# Patient Record
Sex: FEMALE | Race: WHITE
[De-identification: names, ages, dates, MRNs, and addresses within clinical notes are randomized per-mention and may not be internally consistent; named-entity substitution may affect disease eponyms.]

---

## 2018-03-05 NOTE — PDOC
Rapid Medical Evaluation


Time Seen by Provider: 03/05/18 17:48


Medical Evaluation: 


 Allergies











Allergy/AdvReac Type Severity Reaction Status Date / Time


 


No Known Allergies Allergy   Verified 03/14/16 16:54











03/05/18 17:48


I have performed a brief in-person evaluation of this patient.





The patient presents with a chief complaint of numbness in left 4th toe since 

Sunday.  Reports pedicure done 6 days ago.  


Denies injury to toe or wearing tight shoes. 





Pertinent physical exam findings


NAD


unlabored breathing 


ext: left toes, non erythematous, cap refill wnl





I have ordered the following:


urine hcg


The patient will proceed to the ED for further evaluation.

## 2018-03-05 NOTE — PDOC
History of Present Illness





- General


Chief Complaint: Ingrown toenail


Stated Complaint: PAIN


Time Seen by Provider: 03/05/18 17:48





- History of Present Illness


Initial Comments: 





03/05/18 18:38


CHIEF COMPLAINT: toe problem





HISTORY OF PRESENT ILLNESS: 24 yo F with no PMH presents to fast [facility] with 

concerns of numbness to her L 4th toe.  Patient reports that she feels the toe 

is "numb and swollen and I was concerned because there's a family history of 

diabetes and I think my uncle had his toe amputated before."  She denies any 

injury, trauma, or pain to the toe, denies any fever, chills, vomiting, diarrhea

, or any other infectious symptoms.  She denies that she has ever had a problem 

with her blood sugar herself. 





No recent travel or sick contacts. 





PAST MEDICAL HISTORY: Denies past medical history





FAMILY HISTORY: Denies





SOCIAL HISTORY: Denies tobacco, alcohol, illicit drug use. 





SURGICAL HISTORY: Denies





ALLERGIES: No known drug allergies





REVIEW OF SYSTEMS


see HPI





PHYSICAL EXAM


General Appearance: Well-appearing, appropriately dressed.  No apparent distress

, no intoxication.





HEENT: EOMI, PERRLA, normal ENT inspection, normal voice, TMs normal, pharynx 

normal.  No conjunctival pallor.  No photophobia, scleral icterus.





Neck: Supple.  Trachea midline. No tenderness, rigidity, carotid bruit, stridor

, lymphadenopathy, or thyromegaly. 





Respiratory/Chest: Lungs CTAB.  No shortness of breath, chest tenderness, 

respiratory distress, accessory muscle use. No crackles, rales, rhonchi, stridor

, wheezing, dullness





Cardiovascular: RRR. S1, S2.  No JVD, murmur, bradycardia, tachycardia.





Vascular Pulses: Dorsalis-Pedis (R): 2+, Dorsalis-Pedis (L): 2+





Gastrointestinal/Abdominal: Normal bowel sounds.  Abdomen soft, non-distended.  

No tenderness or rebound tenderness. No  organomegaly, pulsatile mass, guarding

, hernia, hepatomegaly, splenomegaly.





Lymphatic: No adenopathy, tenderness.





Musculoskeletal/Extremities:  Normal inspection. FROM of all extremities, 

normal capillary refill.  Pelvis Stable.  No CVA tenderness. No tenderness to 

extremities, pedal edema, swelling, erythema or deformity.





Integumentary: Appropriate color, dry, warm.  No cyanosis, erythema, jaundice 

or rash





Neurologic: CNs II-XII intact. Fully oriented, alert.  Appropriate mood/affect. 

Motor strength 5/5.  No appreciable EOM palsy, facial droop or sensory deficit.

: He





Past History





- Past Medical History


Allergies/Adverse Reactions: 


 Allergies











Allergy/AdvReac Type Severity Reaction Status Date / Time


 


No Known Allergies Allergy   Verified 03/05/18 17:48











Home Medications: 


Ambulatory Orders





NK [No Known Home Medication]  03/05/18 








Asthma: Yes


COPD: No





- Suicide/Smoking/Psychosocial Hx


Smoking History: Never smoked


Hx Alcohol Use: No


Drug/Substance Use Hx: No


Substance Use Type: None





*Physical Exam





- Vital Signs


 Last Vital Signs











Temp Pulse Resp BP Pulse Ox


 


 98.4 F   76   19   129/84   99 


 


 03/05/18 17:48  03/05/18 17:48  03/05/18 17:48  03/05/18 17:48  03/05/18 17:48














ED Treatment Course





- RADIOLOGY


Radiology Studies Ordered: 














 Category Date Time Status


 


 TOE(S) LEFT [RAD] Stat Radiology  03/05/18 18:16 Ordered














Medical Decision Making





- Medical Decision Making





03/05/18 18:44


 24 yo F with no PMH presents to fast track with concerns of numbness to her L 

4th toe.





-toe x-ray





x-ray negative





Advised patient to take medication as prescribed and follow up with podiatry if 

symptoms persist.  Advised patient of signs and symptoms for return to ED.  

Patient verbalized understanding and agrees to plan.





*DC/Admit/Observation/Transfer


Diagnosis at time of Disposition: 


 Toe problem








- Discharge Dispostion


Disposition: HOME


Condition at time of disposition: Stable


Admit: No





- Referrals


Referrals: 


Chalo Humphreys [Primary Care Provider] - 


Kendell Pedraza MD [Staff Physician] - 





- Patient Instructions


Additional Instructions: 


As discussed, you need to follow up with your primary care doctor and podiatry 

for further evaluation.  If you develop any new or worsening symptoms, 

especially fever, chills, vomiting, or diarrhea, please return to the ER. 





- Post Discharge Activity

## 2022-09-23 ENCOUNTER — HOSPITAL ENCOUNTER (EMERGENCY)
Dept: HOSPITAL 74 - JER | Age: 27
LOS: 1 days | Discharge: HOME | End: 2022-09-24
Payer: COMMERCIAL

## 2022-09-23 VITALS — BODY MASS INDEX: 38.9 KG/M2

## 2022-09-23 VITALS
TEMPERATURE: 98.1 F | HEART RATE: 86 BPM | SYSTOLIC BLOOD PRESSURE: 117 MMHG | RESPIRATION RATE: 19 BRPM | DIASTOLIC BLOOD PRESSURE: 81 MMHG

## 2022-09-23 DIAGNOSIS — O23.41: Primary | ICD-10-CM

## 2022-09-23 DIAGNOSIS — O26.891: ICD-10-CM

## 2022-09-23 DIAGNOSIS — Z3A.10: ICD-10-CM

## 2022-09-23 DIAGNOSIS — R51.9: ICD-10-CM

## 2022-09-23 LAB
ANION GAP SERPL CALC-SCNC: 6 MMOL/L (ref 8–16)
APPEARANCE UR: (no result)
BACTERIA # UR AUTO: (no result) /UL (ref 0–1359)
BASOPHILS # BLD: 0.8 % (ref 0–2)
BILIRUB UR STRIP.AUTO-MCNC: NEGATIVE MG/DL
BUN SERPL-MCNC: 6 MG/DL (ref 7–18)
CALCIUM SERPL-MCNC: 8.9 MG/DL (ref 8.5–10.1)
CASTS URNS QL MICRO: 2 /UL (ref 0–3.1)
CHLORIDE SERPL-SCNC: 107 MMOL/L (ref 98–107)
CO2 SERPL-SCNC: 24 MMOL/L (ref 21–32)
COLOR UR: YELLOW
CREAT SERPL-MCNC: 0.5 MG/DL (ref 0.55–1.3)
DEPRECATED RDW RBC AUTO: 13.9 % (ref 11.6–15.6)
EOSINOPHIL # BLD: 0.3 % (ref 0–4.5)
EPITH CASTS URNS QL MICRO: >36 /UL (ref 0–25.1)
GLUCOSE SERPL-MCNC: 82 MG/DL (ref 74–106)
HCT VFR BLD CALC: 36 % (ref 32.4–45.2)
HGB BLD-MCNC: 12.1 GM/DL (ref 10.7–15.3)
KETONES UR QL STRIP: (no result)
LEUKOCYTE ESTERASE UR QL STRIP.AUTO: (no result)
LYMPHOCYTES # BLD: 29.6 % (ref 8–40)
MCH RBC QN AUTO: 29.8 PG (ref 25.7–33.7)
MCHC RBC AUTO-ENTMCNC: 33.6 G/DL (ref 32–36)
MCV RBC: 88.4 FL (ref 80–96)
MONOCYTES # BLD AUTO: 7.6 % (ref 3.8–10.2)
NEUTROPHILS # BLD: 61.7 % (ref 42.8–82.8)
NITRITE UR QL STRIP: POSITIVE
PH UR: 7 [PH] (ref 5–8)
PLATELET # BLD AUTO: 307 10^3/UL (ref 134–434)
PMV BLD: 7.5 FL (ref 7.5–11.1)
PROT UR QL STRIP: NEGATIVE
PROT UR QL STRIP: NEGATIVE
RBC # BLD AUTO: 19 /UL (ref 0–23.9)
RBC # BLD AUTO: 4.07 M/MM3 (ref 3.6–5.2)
SODIUM SERPL-SCNC: 137 MMOL/L (ref 136–145)
SP GR UR: 1.01 (ref 1.01–1.03)
UROBILINOGEN UR STRIP-MCNC: 0.2 MG/DL (ref 0.2–1)
WBC # BLD AUTO: 11.1 K/MM3 (ref 4–10)
WBC # UR AUTO: 30 /UL (ref 0–25.8)

## 2022-09-23 PROCEDURE — 3E0337Z INTRODUCTION OF ELECTROLYTIC AND WATER BALANCE SUBSTANCE INTO PERIPHERAL VEIN, PERCUTANEOUS APPROACH: ICD-10-PCS

## 2022-09-23 PROCEDURE — 3E0333Z INTRODUCTION OF ANTI-INFLAMMATORY INTO PERIPHERAL VEIN, PERCUTANEOUS APPROACH: ICD-10-PCS
